# Patient Record
Sex: FEMALE | Race: ASIAN | NOT HISPANIC OR LATINO | URBAN - METROPOLITAN AREA
[De-identification: names, ages, dates, MRNs, and addresses within clinical notes are randomized per-mention and may not be internally consistent; named-entity substitution may affect disease eponyms.]

---

## 2017-08-27 ENCOUNTER — EMERGENCY (EMERGENCY)
Facility: HOSPITAL | Age: 57
LOS: 1 days | Discharge: HOME | End: 2017-08-27
Attending: EMERGENCY MEDICINE | Admitting: EMERGENCY MEDICINE
Payer: COMMERCIAL

## 2017-08-27 VITALS
WEIGHT: 115.08 LBS | DIASTOLIC BLOOD PRESSURE: 80 MMHG | HEART RATE: 82 BPM | RESPIRATION RATE: 18 BRPM | SYSTOLIC BLOOD PRESSURE: 196 MMHG | TEMPERATURE: 100 F | OXYGEN SATURATION: 99 %

## 2017-08-27 DIAGNOSIS — Z98.891 HISTORY OF UTERINE SCAR FROM PREVIOUS SURGERY: Chronic | ICD-10-CM

## 2017-08-27 PROCEDURE — 99285 EMERGENCY DEPT VISIT HI MDM: CPT | Mod: 25

## 2017-08-27 NOTE — ED ADULT NURSE NOTE - OBJECTIVE STATEMENT
57 y/o F, A&Ox3, enters ED w/ c/o flank pain. Pt. reports flank pain started suddenly earlier this evening. Pt. took Tylenol around 2230 on 8/27. Pt. denies fever/chills/diarrhea but reports nausea/vomiting. Pt. vomiting while in ED - given IV Zofran as per MD's orders. No blood in vomit. Pt. has positive CVA tenderness and tender to the RUQ and RLQs. Pt. still has appendix. Pt. also reports hematuria since Thursday or Friday. Skin warm, dry and intact. Family at bedside.

## 2017-08-27 NOTE — ED ADULT TRIAGE NOTE - CHIEF COMPLAINT QUOTE
right flank pain for past few hours; hematuria 2 or 3 days ago right flank pain for past few hours; hematuria 2 or 3 days ago; nausea

## 2017-08-28 VITALS
DIASTOLIC BLOOD PRESSURE: 63 MMHG | SYSTOLIC BLOOD PRESSURE: 105 MMHG | OXYGEN SATURATION: 97 % | TEMPERATURE: 99 F | HEART RATE: 75 BPM | RESPIRATION RATE: 18 BRPM

## 2017-08-28 LAB
ALBUMIN SERPL ELPH-MCNC: 4.7 G/DL — SIGNIFICANT CHANGE UP (ref 3.3–5)
ALP SERPL-CCNC: 56 U/L — SIGNIFICANT CHANGE UP (ref 40–120)
ALT FLD-CCNC: 20 U/L RC — SIGNIFICANT CHANGE UP (ref 10–45)
ANION GAP SERPL CALC-SCNC: 15 MMOL/L — SIGNIFICANT CHANGE UP (ref 5–17)
APPEARANCE UR: CLEAR — SIGNIFICANT CHANGE UP
AST SERPL-CCNC: 24 U/L — SIGNIFICANT CHANGE UP (ref 10–40)
BASOPHILS # BLD AUTO: 0.1 K/UL — SIGNIFICANT CHANGE UP (ref 0–0.2)
BASOPHILS NFR BLD AUTO: 1.2 % — SIGNIFICANT CHANGE UP (ref 0–2)
BILIRUB SERPL-MCNC: 0.4 MG/DL — SIGNIFICANT CHANGE UP (ref 0.2–1.2)
BILIRUB UR-MCNC: NEGATIVE — SIGNIFICANT CHANGE UP
BUN SERPL-MCNC: 18 MG/DL — SIGNIFICANT CHANGE UP (ref 7–23)
CALCIUM SERPL-MCNC: 10.1 MG/DL — SIGNIFICANT CHANGE UP (ref 8.4–10.5)
CHLORIDE SERPL-SCNC: 99 MMOL/L — SIGNIFICANT CHANGE UP (ref 96–108)
CO2 SERPL-SCNC: 30 MMOL/L — SIGNIFICANT CHANGE UP (ref 22–31)
COLOR SPEC: COLORLESS — SIGNIFICANT CHANGE UP
COMMENT - URINE: SIGNIFICANT CHANGE UP
CREAT SERPL-MCNC: 1.14 MG/DL — SIGNIFICANT CHANGE UP (ref 0.5–1.3)
DIFF PNL FLD: ABNORMAL
EOSINOPHIL # BLD AUTO: 0.4 K/UL — SIGNIFICANT CHANGE UP (ref 0–0.5)
EOSINOPHIL NFR BLD AUTO: 4.7 % — SIGNIFICANT CHANGE UP (ref 0–6)
EPI CELLS # UR: SIGNIFICANT CHANGE UP /HPF
GLUCOSE SERPL-MCNC: 139 MG/DL — HIGH (ref 70–99)
GLUCOSE UR QL: 50
HCT VFR BLD CALC: 38.9 % — SIGNIFICANT CHANGE UP (ref 34.5–45)
HGB BLD-MCNC: 13.4 G/DL — SIGNIFICANT CHANGE UP (ref 11.5–15.5)
KETONES UR-MCNC: NEGATIVE — SIGNIFICANT CHANGE UP
LEUKOCYTE ESTERASE UR-ACNC: ABNORMAL
LYMPHOCYTES # BLD AUTO: 2.8 K/UL — SIGNIFICANT CHANGE UP (ref 1–3.3)
LYMPHOCYTES # BLD AUTO: 35.4 % — SIGNIFICANT CHANGE UP (ref 13–44)
MCHC RBC-ENTMCNC: 32.6 PG — SIGNIFICANT CHANGE UP (ref 27–34)
MCHC RBC-ENTMCNC: 34.4 GM/DL — SIGNIFICANT CHANGE UP (ref 32–36)
MCV RBC AUTO: 94.9 FL — SIGNIFICANT CHANGE UP (ref 80–100)
MONOCYTES # BLD AUTO: 0.6 K/UL — SIGNIFICANT CHANGE UP (ref 0–0.9)
MONOCYTES NFR BLD AUTO: 7.3 % — SIGNIFICANT CHANGE UP (ref 2–14)
NEUTROPHILS # BLD AUTO: 4 K/UL — SIGNIFICANT CHANGE UP (ref 1.8–7.4)
NEUTROPHILS NFR BLD AUTO: 51.4 % — SIGNIFICANT CHANGE UP (ref 43–77)
NITRITE UR-MCNC: NEGATIVE — SIGNIFICANT CHANGE UP
PH UR: 7.5 — SIGNIFICANT CHANGE UP (ref 5–8)
PLATELET # BLD AUTO: 329 K/UL — SIGNIFICANT CHANGE UP (ref 150–400)
POTASSIUM SERPL-MCNC: 3.9 MMOL/L — SIGNIFICANT CHANGE UP (ref 3.5–5.3)
POTASSIUM SERPL-SCNC: 3.9 MMOL/L — SIGNIFICANT CHANGE UP (ref 3.5–5.3)
PROT SERPL-MCNC: 8.4 G/DL — HIGH (ref 6–8.3)
PROT UR-MCNC: SIGNIFICANT CHANGE UP
RBC # BLD: 4.09 M/UL — SIGNIFICANT CHANGE UP (ref 3.8–5.2)
RBC # FLD: 11.5 % — SIGNIFICANT CHANGE UP (ref 10.3–14.5)
RBC CASTS # UR COMP ASSIST: >50 /HPF (ref 0–2)
SODIUM SERPL-SCNC: 144 MMOL/L — SIGNIFICANT CHANGE UP (ref 135–145)
SP GR SPEC: 1.01 — LOW (ref 1.01–1.02)
UROBILINOGEN FLD QL: NEGATIVE — SIGNIFICANT CHANGE UP
WBC # BLD: 7.8 K/UL — SIGNIFICANT CHANGE UP (ref 3.8–10.5)
WBC # FLD AUTO: 7.8 K/UL — SIGNIFICANT CHANGE UP (ref 3.8–10.5)
WBC UR QL: SIGNIFICANT CHANGE UP /HPF (ref 0–5)

## 2017-08-28 PROCEDURE — 99284 EMERGENCY DEPT VISIT MOD MDM: CPT | Mod: 25

## 2017-08-28 PROCEDURE — 85027 COMPLETE CBC AUTOMATED: CPT

## 2017-08-28 PROCEDURE — 74176 CT ABD & PELVIS W/O CONTRAST: CPT

## 2017-08-28 PROCEDURE — 96374 THER/PROPH/DIAG INJ IV PUSH: CPT

## 2017-08-28 PROCEDURE — 81001 URINALYSIS AUTO W/SCOPE: CPT

## 2017-08-28 PROCEDURE — 96375 TX/PRO/DX INJ NEW DRUG ADDON: CPT

## 2017-08-28 PROCEDURE — 80053 COMPREHEN METABOLIC PANEL: CPT

## 2017-08-28 PROCEDURE — 74176 CT ABD & PELVIS W/O CONTRAST: CPT | Mod: 26

## 2017-08-28 RX ORDER — SODIUM CHLORIDE 9 MG/ML
3 INJECTION INTRAMUSCULAR; INTRAVENOUS; SUBCUTANEOUS ONCE
Refills: 0 | Status: COMPLETED | OUTPATIENT
Start: 2017-08-28 | End: 2017-08-28

## 2017-08-28 RX ORDER — ONDANSETRON 8 MG/1
4 TABLET, FILM COATED ORAL ONCE
Refills: 0 | Status: COMPLETED | OUTPATIENT
Start: 2017-08-28 | End: 2017-08-28

## 2017-08-28 RX ORDER — KETOROLAC TROMETHAMINE 30 MG/ML
30 SYRINGE (ML) INJECTION ONCE
Refills: 0 | Status: DISCONTINUED | OUTPATIENT
Start: 2017-08-28 | End: 2017-08-28

## 2017-08-28 RX ORDER — IBUPROFEN 200 MG
1 TABLET ORAL
Qty: 42 | Refills: 0
Start: 2017-08-28 | End: 2017-09-04

## 2017-08-28 RX ADMIN — Medication 30 MILLIGRAM(S): at 02:20

## 2017-08-28 RX ADMIN — ONDANSETRON 4 MILLIGRAM(S): 8 TABLET, FILM COATED ORAL at 00:13

## 2017-08-28 RX ADMIN — Medication 30 MILLIGRAM(S): at 00:13

## 2017-08-28 RX ADMIN — SODIUM CHLORIDE 3 MILLILITER(S): 9 INJECTION INTRAMUSCULAR; INTRAVENOUS; SUBCUTANEOUS at 00:11

## 2017-08-28 NOTE — ED PROVIDER NOTE - OBJECTIVE STATEMENT
Pt is a 56 year old F with pmh of diabetes, hypertension, hyperlipidemia, presenting with right sided flank pain of 7/10 intensity that radiates to the groin associated with nausea and vomiting. The pain has been going on for the last few hours and is unremitting. Additionally she has noticed blood in her urine and it burns when she urinates. She has never experienced these symptoms before. Pt denies fever, HA, dizziness, CP, SOB, diarrhea. Pt is a 56 year old F with pmh of diabetes, hypertension, hyperlipidemia, presenting with right sided flank pain of 7/10 intensity that radiates to the groin associated with nausea and vomiting. The pain has been going on for the last few hours and is unremitting. Additionally she has noticed blood in her urine and pain when she urinates. She has never experienced these symptoms before. Pt denies fever, HA, dizziness, CP, SOB, diarrhea.

## 2017-08-28 NOTE — ED PROVIDER NOTE - MEDICAL DECISION MAKING DETAILS
Pt is a 56 year old F pmh DM, HTN, hyperthyroid, w/ rt sided flank pain, hematuria, and dysuria. Pt is a 56 year old F pmh DM, HTN, hyperthyroid, w/ rt sided flank pain, hematuria, and dysuria. Will order CT with co Pt is a 56 year old F pmh DM, HTN, hyperthyroid, w/ rt sided flank pain, hematuria, and dysuria. Will order noncontrast CT, fluids, pain control, Pt is a 56 year old F pmh DM, HTN, hyperthyroid, w/ rt sided flank pain, hematuria, and dysuria. Will order noncontrast CT of abdomen/flank, UA, fluids, pain control, antiemetics.

## 2017-08-28 NOTE — ED PROVIDER NOTE - CARE PLAN
Principal Discharge DX:	Nephrolithiasis  Instructions for follow-up, activity and diet:	Please followup with Dr. Mathew Kirby at [phone]. Take ibuprofen every six hours as needed with food. Drink at least 2 Liters or 64 Ounces of water each day. Return for any persistent, worsening symptoms, or ANY concerns at all. Principal Discharge DX:	Nephrolithiasis  Instructions for follow-up, activity and diet:	Please follow-up with a urologist as soon as possible. Take the ibuprofen every six hours as needed with food and drink at least 2 Liters or 64 Ounces of water each day. Return for any persistent, worsening symptoms, or ANY concerns at all.

## 2017-08-28 NOTE — ED PROVIDER NOTE - PLAN OF CARE
Please followup with Dr. Mathew Kirby at [phone]. Take ibuprofen every six hours as needed with food. Drink at least 2 Liters or 64 Ounces of water each day. Return for any persistent, worsening symptoms, or ANY concerns at all. Please follow-up with a urologist as soon as possible. Take the ibuprofen every six hours as needed with food and drink at least 2 Liters or 64 Ounces of water each day. Return for any persistent, worsening symptoms, or ANY concerns at all.

## 2017-08-28 NOTE — ED PROVIDER NOTE - ATTENDING CONTRIBUTION TO CARE
Patient presenting with R flank pain associated with hematuria beginning two days ago.  Some associated urinary frequency/dysuria.  No fevers or chills, associated nausea but no radiation of pain into abdomen.  On exam patient vital signs within normal limits, uncomfortable but non toxic, RRR S1S2, lungs clear to ascultation bilaterally, abdomen soft, non tender, non distended, no CVA tenderness, afebrile in ED.  History most consistent with renal colic, lower probability for pyelonephritis given lack of fever/CVA tenderness, no evidence of surgical abdominal process on exam, plan for labs, pain control, CT A/P, re-evaluate.

## 2017-08-28 NOTE — ED PROVIDER NOTE - NEURO NEGATIVE STATEMENT, MLM
no loss of consciousness, no gait abnormality, no headache, no sensory deficits, and no weakness. no headache

## 2017-10-25 ENCOUNTER — INPATIENT (INPATIENT)
Facility: HOSPITAL | Age: 57
LOS: 1 days | Discharge: HOME | DRG: 690 | End: 2017-10-27
Attending: HOSPITALIST | Admitting: HOSPITALIST
Payer: COMMERCIAL

## 2017-10-25 VITALS
DIASTOLIC BLOOD PRESSURE: 77 MMHG | OXYGEN SATURATION: 98 % | TEMPERATURE: 99 F | SYSTOLIC BLOOD PRESSURE: 171 MMHG | RESPIRATION RATE: 20 BRPM | HEART RATE: 81 BPM

## 2017-10-25 DIAGNOSIS — Z98.891 HISTORY OF UTERINE SCAR FROM PREVIOUS SURGERY: Chronic | ICD-10-CM

## 2017-10-25 LAB
ALBUMIN SERPL ELPH-MCNC: 3.9 G/DL — SIGNIFICANT CHANGE UP (ref 3.3–5)
ALP SERPL-CCNC: 86 U/L — SIGNIFICANT CHANGE UP (ref 40–120)
ALT FLD-CCNC: 15 U/L RC — SIGNIFICANT CHANGE UP (ref 10–45)
ANION GAP SERPL CALC-SCNC: 17 MMOL/L — SIGNIFICANT CHANGE UP (ref 5–17)
APPEARANCE UR: CLEAR — SIGNIFICANT CHANGE UP
APTT BLD: 31.3 SEC — SIGNIFICANT CHANGE UP (ref 27.5–37.4)
AST SERPL-CCNC: 22 U/L — SIGNIFICANT CHANGE UP (ref 10–40)
BASOPHILS # BLD AUTO: 0 K/UL — SIGNIFICANT CHANGE UP (ref 0–0.2)
BASOPHILS NFR BLD AUTO: 0.2 % — SIGNIFICANT CHANGE UP (ref 0–2)
BILIRUB SERPL-MCNC: 0.5 MG/DL — SIGNIFICANT CHANGE UP (ref 0.2–1.2)
BILIRUB UR-MCNC: NEGATIVE — SIGNIFICANT CHANGE UP
BUN SERPL-MCNC: 28 MG/DL — HIGH (ref 7–23)
CALCIUM SERPL-MCNC: 10.2 MG/DL — SIGNIFICANT CHANGE UP (ref 8.4–10.5)
CHLORIDE SERPL-SCNC: 83 MMOL/L — LOW (ref 96–108)
CO2 SERPL-SCNC: 29 MMOL/L — SIGNIFICANT CHANGE UP (ref 22–31)
COLOR SPEC: YELLOW — SIGNIFICANT CHANGE UP
CREAT SERPL-MCNC: 1.99 MG/DL — HIGH (ref 0.5–1.3)
DIFF PNL FLD: ABNORMAL
EOSINOPHIL # BLD AUTO: 0.2 K/UL — SIGNIFICANT CHANGE UP (ref 0–0.5)
EOSINOPHIL NFR BLD AUTO: 1.6 % — SIGNIFICANT CHANGE UP (ref 0–6)
GAS PNL BLDV: SIGNIFICANT CHANGE UP
GLUCOSE SERPL-MCNC: 109 MG/DL — HIGH (ref 70–99)
GLUCOSE UR QL: NEGATIVE — SIGNIFICANT CHANGE UP
HCT VFR BLD CALC: 30.2 % — LOW (ref 34.5–45)
HGB BLD-MCNC: 10.7 G/DL — LOW (ref 11.5–15.5)
INR BLD: 1.04 RATIO — SIGNIFICANT CHANGE UP (ref 0.88–1.16)
KETONES UR-MCNC: NEGATIVE — SIGNIFICANT CHANGE UP
LEUKOCYTE ESTERASE UR-ACNC: ABNORMAL
LYMPHOCYTES # BLD AUTO: 1.9 K/UL — SIGNIFICANT CHANGE UP (ref 1–3.3)
LYMPHOCYTES # BLD AUTO: 16 % — SIGNIFICANT CHANGE UP (ref 13–44)
MCHC RBC-ENTMCNC: 33 PG — SIGNIFICANT CHANGE UP (ref 27–34)
MCHC RBC-ENTMCNC: 35.5 GM/DL — SIGNIFICANT CHANGE UP (ref 32–36)
MCV RBC AUTO: 93 FL — SIGNIFICANT CHANGE UP (ref 80–100)
MONOCYTES # BLD AUTO: 1 K/UL — HIGH (ref 0–0.9)
MONOCYTES NFR BLD AUTO: 8.2 % — SIGNIFICANT CHANGE UP (ref 2–14)
NEUTROPHILS # BLD AUTO: 8.9 K/UL — HIGH (ref 1.8–7.4)
NEUTROPHILS NFR BLD AUTO: 74.1 % — SIGNIFICANT CHANGE UP (ref 43–77)
NITRITE UR-MCNC: NEGATIVE — SIGNIFICANT CHANGE UP
PH UR: 5.5 — SIGNIFICANT CHANGE UP (ref 5–8)
PLATELET # BLD AUTO: 487 K/UL — HIGH (ref 150–400)
POTASSIUM SERPL-MCNC: 3.3 MMOL/L — LOW (ref 3.5–5.3)
POTASSIUM SERPL-SCNC: 3.3 MMOL/L — LOW (ref 3.5–5.3)
PROT SERPL-MCNC: 8.1 G/DL — SIGNIFICANT CHANGE UP (ref 6–8.3)
PROT UR-MCNC: 30 MG/DL
PROTHROM AB SERPL-ACNC: 11.2 SEC — SIGNIFICANT CHANGE UP (ref 9.8–12.7)
RBC # BLD: 3.25 M/UL — LOW (ref 3.8–5.2)
RBC # FLD: 11.7 % — SIGNIFICANT CHANGE UP (ref 10.3–14.5)
RBC CASTS # UR COMP ASSIST: SIGNIFICANT CHANGE UP /HPF (ref 0–2)
SODIUM SERPL-SCNC: 129 MMOL/L — LOW (ref 135–145)
SP GR SPEC: 1.01 — LOW (ref 1.01–1.02)
UROBILINOGEN FLD QL: NEGATIVE — SIGNIFICANT CHANGE UP
WBC # BLD: 12 K/UL — HIGH (ref 3.8–10.5)
WBC # FLD AUTO: 12 K/UL — HIGH (ref 3.8–10.5)
WBC UR QL: SIGNIFICANT CHANGE UP /HPF (ref 0–5)

## 2017-10-25 PROCEDURE — 99285 EMERGENCY DEPT VISIT HI MDM: CPT

## 2017-10-25 PROCEDURE — 74176 CT ABD & PELVIS W/O CONTRAST: CPT | Mod: 26

## 2017-10-25 RX ORDER — POTASSIUM CHLORIDE 20 MEQ
10 PACKET (EA) ORAL ONCE
Refills: 0 | Status: COMPLETED | OUTPATIENT
Start: 2017-10-25 | End: 2017-10-25

## 2017-10-25 RX ORDER — CEFTRIAXONE 500 MG/1
1 INJECTION, POWDER, FOR SOLUTION INTRAMUSCULAR; INTRAVENOUS ONCE
Refills: 0 | Status: COMPLETED | OUTPATIENT
Start: 2017-10-25 | End: 2017-10-25

## 2017-10-25 RX ORDER — POTASSIUM CHLORIDE 20 MEQ
40 PACKET (EA) ORAL ONCE
Refills: 0 | Status: COMPLETED | OUTPATIENT
Start: 2017-10-25 | End: 2017-10-25

## 2017-10-25 RX ORDER — SODIUM CHLORIDE 9 MG/ML
1000 INJECTION INTRAMUSCULAR; INTRAVENOUS; SUBCUTANEOUS ONCE
Refills: 0 | Status: COMPLETED | OUTPATIENT
Start: 2017-10-25 | End: 2017-10-25

## 2017-10-25 RX ADMIN — Medication 40 MILLIEQUIVALENT(S): at 22:24

## 2017-10-25 RX ADMIN — Medication 100 MILLIEQUIVALENT(S): at 22:29

## 2017-10-25 RX ADMIN — SODIUM CHLORIDE 1000 MILLILITER(S): 9 INJECTION INTRAMUSCULAR; INTRAVENOUS; SUBCUTANEOUS at 22:29

## 2017-10-25 RX ADMIN — CEFTRIAXONE 100 GRAM(S): 500 INJECTION, POWDER, FOR SOLUTION INTRAMUSCULAR; INTRAVENOUS at 23:59

## 2017-10-25 NOTE — ED PROVIDER NOTE - MEDICAL DECISION MAKING DETAILS
57 y/o F pmhx renal stone with recent diagnosis of UTI failed macrobid L flank pain radiating to LLQ today, subjective fever and chills, scheduled for urology procedure 11/2. PE remarkable for mild LLQ tenderness and L CVAT. will obtain urine, labs, provide ivf, ct a/p and reassess.

## 2017-10-25 NOTE — ED PROVIDER NOTE - OBJECTIVE STATEMENT
55 y/o F pmhx DM, HTN, HLD, presenting L sided flank pain since yesterday. States pain is located on L side and radiates into the L lower abdomen. She reports that she was diagnosed with a large kidney stone on the L in August, has been following with a urologist at [facility] since that time, and has a procedure scheduled for 11/3/17 to remove the stone. She reports that she was feeling well until 3 days ago she developed fevers and chills with dysuria, went to her PMD who prescribed her macrobid which she has been taking but has not experienced any improvement. She reports that this morning her pain became significantly worse 'went to her primary care doctor who gave her a shot for her pain and tried to take a urine sample because she was there for her pre-op testing.' She reports that she has been having difficulty with urination since this morning as well, passing only small amounts even though she is hydrating. Denies any fever or chills today. Denies nausea or vomiting.

## 2017-10-25 NOTE — ED ADULT NURSE NOTE - OBJECTIVE STATEMENT
Received patient awake and alert x 4, presents with left sided flank pain since yesterday and unable to urinate. States she had kidney stones in August and has been following up with a urologist. Denies any fever/chills, no nausea/vomiting. Breathing unlabored with no S/S of distress noted. family at bedside, safety maintained, will continue to monitor.

## 2017-10-25 NOTE — ED PROVIDER NOTE - ATTENDING CONTRIBUTION TO CARE
Pt with recent UTI and prior kidney stones presents with >1 week of fever, chills, and now one day of L flank pain, resolved after "shot of medication" by her PCP this morning, assoc with poor urine output.  No vomiting, cp, sob.  Exam: mild CVA td L.

## 2017-10-26 DIAGNOSIS — E11.9 TYPE 2 DIABETES MELLITUS WITHOUT COMPLICATIONS: ICD-10-CM

## 2017-10-26 DIAGNOSIS — N20.0 CALCULUS OF KIDNEY: ICD-10-CM

## 2017-10-26 DIAGNOSIS — N17.9 ACUTE KIDNEY FAILURE, UNSPECIFIED: ICD-10-CM

## 2017-10-26 DIAGNOSIS — N39.0 URINARY TRACT INFECTION, SITE NOT SPECIFIED: ICD-10-CM

## 2017-10-26 DIAGNOSIS — Z29.9 ENCOUNTER FOR PROPHYLACTIC MEASURES, UNSPECIFIED: ICD-10-CM

## 2017-10-26 DIAGNOSIS — I10 ESSENTIAL (PRIMARY) HYPERTENSION: ICD-10-CM

## 2017-10-26 LAB
ANION GAP SERPL CALC-SCNC: 16 MMOL/L — SIGNIFICANT CHANGE UP (ref 5–17)
BASOPHILS # BLD AUTO: 0.02 K/UL — SIGNIFICANT CHANGE UP (ref 0–0.2)
BASOPHILS NFR BLD AUTO: 0.2 % — SIGNIFICANT CHANGE UP (ref 0–2)
BUN SERPL-MCNC: 20 MG/DL — SIGNIFICANT CHANGE UP (ref 7–23)
CALCIUM SERPL-MCNC: 9.8 MG/DL — SIGNIFICANT CHANGE UP (ref 8.4–10.5)
CHLORIDE SERPL-SCNC: 93 MMOL/L — LOW (ref 96–108)
CO2 SERPL-SCNC: 27 MMOL/L — SIGNIFICANT CHANGE UP (ref 22–31)
CREAT SERPL-MCNC: 1.09 MG/DL — SIGNIFICANT CHANGE UP (ref 0.5–1.3)
EOSINOPHIL # BLD AUTO: 0.16 K/UL — SIGNIFICANT CHANGE UP (ref 0–0.5)
EOSINOPHIL NFR BLD AUTO: 1.5 % — SIGNIFICANT CHANGE UP (ref 0–6)
GLUCOSE SERPL-MCNC: 133 MG/DL — HIGH (ref 70–99)
HCT VFR BLD CALC: 30.6 % — LOW (ref 34.5–45)
HGB BLD-MCNC: 10.5 G/DL — LOW (ref 11.5–15.5)
IMM GRANULOCYTES NFR BLD AUTO: 0.4 % — SIGNIFICANT CHANGE UP (ref 0–1.5)
LYMPHOCYTES # BLD AUTO: 1.44 K/UL — SIGNIFICANT CHANGE UP (ref 1–3.3)
LYMPHOCYTES # BLD AUTO: 13.6 % — SIGNIFICANT CHANGE UP (ref 13–44)
MAGNESIUM SERPL-MCNC: 1.8 MG/DL — SIGNIFICANT CHANGE UP (ref 1.6–2.6)
MCHC RBC-ENTMCNC: 30.5 PG — SIGNIFICANT CHANGE UP (ref 27–34)
MCHC RBC-ENTMCNC: 34.3 GM/DL — SIGNIFICANT CHANGE UP (ref 32–36)
MCV RBC AUTO: 89 FL — SIGNIFICANT CHANGE UP (ref 80–100)
MONOCYTES # BLD AUTO: 0.95 K/UL — HIGH (ref 0–0.9)
MONOCYTES NFR BLD AUTO: 9 % — SIGNIFICANT CHANGE UP (ref 2–14)
NEUTROPHILS # BLD AUTO: 7.96 K/UL — HIGH (ref 1.8–7.4)
NEUTROPHILS NFR BLD AUTO: 75.3 % — SIGNIFICANT CHANGE UP (ref 43–77)
PHOSPHATE SERPL-MCNC: 3.3 MG/DL — SIGNIFICANT CHANGE UP (ref 2.5–4.5)
PLATELET # BLD AUTO: 520 K/UL — HIGH (ref 150–400)
POTASSIUM SERPL-MCNC: 4.1 MMOL/L — SIGNIFICANT CHANGE UP (ref 3.5–5.3)
POTASSIUM SERPL-SCNC: 4.1 MMOL/L — SIGNIFICANT CHANGE UP (ref 3.5–5.3)
RBC # BLD: 3.44 M/UL — LOW (ref 3.8–5.2)
RBC # FLD: 12.9 % — SIGNIFICANT CHANGE UP (ref 10.3–14.5)
SODIUM SERPL-SCNC: 136 MMOL/L — SIGNIFICANT CHANGE UP (ref 135–145)
WBC # BLD: 10.57 K/UL — HIGH (ref 3.8–10.5)
WBC # FLD AUTO: 10.57 K/UL — HIGH (ref 3.8–10.5)

## 2017-10-26 PROCEDURE — 99253 IP/OBS CNSLTJ NEW/EST LOW 45: CPT

## 2017-10-26 PROCEDURE — 99221 1ST HOSP IP/OBS SF/LOW 40: CPT

## 2017-10-26 PROCEDURE — 99223 1ST HOSP IP/OBS HIGH 75: CPT | Mod: GC

## 2017-10-26 PROCEDURE — 12345: CPT | Mod: GC,NC

## 2017-10-26 RX ORDER — TAMSULOSIN HYDROCHLORIDE 0.4 MG/1
0.8 CAPSULE ORAL AT BEDTIME
Refills: 0 | Status: DISCONTINUED | OUTPATIENT
Start: 2017-10-26 | End: 2017-10-27

## 2017-10-26 RX ORDER — HEPARIN SODIUM 5000 [USP'U]/ML
5000 INJECTION INTRAVENOUS; SUBCUTANEOUS EVERY 8 HOURS
Refills: 0 | Status: DISCONTINUED | OUTPATIENT
Start: 2017-10-26 | End: 2017-10-27

## 2017-10-26 RX ORDER — ASPIRIN/CALCIUM CARB/MAGNESIUM 324 MG
81 TABLET ORAL DAILY
Refills: 0 | Status: DISCONTINUED | OUTPATIENT
Start: 2017-10-26 | End: 2017-10-27

## 2017-10-26 RX ORDER — CEFTRIAXONE 500 MG/1
1 INJECTION, POWDER, FOR SOLUTION INTRAMUSCULAR; INTRAVENOUS EVERY 24 HOURS
Refills: 0 | Status: DISCONTINUED | OUTPATIENT
Start: 2017-10-26 | End: 2017-10-27

## 2017-10-26 RX ORDER — INFLUENZA VIRUS VACCINE 15; 15; 15; 15 UG/.5ML; UG/.5ML; UG/.5ML; UG/.5ML
0.5 SUSPENSION INTRAMUSCULAR ONCE
Refills: 0 | Status: DISCONTINUED | OUTPATIENT
Start: 2017-10-26 | End: 2017-10-27

## 2017-10-26 RX ORDER — SODIUM CHLORIDE 9 MG/ML
1000 INJECTION, SOLUTION INTRAVENOUS
Refills: 0 | Status: DISCONTINUED | OUTPATIENT
Start: 2017-10-26 | End: 2017-10-27

## 2017-10-26 RX ORDER — DEXTROSE 50 % IN WATER 50 %
12.5 SYRINGE (ML) INTRAVENOUS ONCE
Refills: 0 | Status: DISCONTINUED | OUTPATIENT
Start: 2017-10-26 | End: 2017-10-27

## 2017-10-26 RX ORDER — INSULIN LISPRO 100/ML
VIAL (ML) SUBCUTANEOUS
Refills: 0 | Status: DISCONTINUED | OUTPATIENT
Start: 2017-10-26 | End: 2017-10-27

## 2017-10-26 RX ORDER — DEXTROSE 50 % IN WATER 50 %
1 SYRINGE (ML) INTRAVENOUS ONCE
Refills: 0 | Status: DISCONTINUED | OUTPATIENT
Start: 2017-10-26 | End: 2017-10-27

## 2017-10-26 RX ORDER — DEXTROSE 50 % IN WATER 50 %
25 SYRINGE (ML) INTRAVENOUS ONCE
Refills: 0 | Status: DISCONTINUED | OUTPATIENT
Start: 2017-10-26 | End: 2017-10-27

## 2017-10-26 RX ORDER — INSULIN LISPRO 100/ML
VIAL (ML) SUBCUTANEOUS AT BEDTIME
Refills: 0 | Status: DISCONTINUED | OUTPATIENT
Start: 2017-10-26 | End: 2017-10-27

## 2017-10-26 RX ORDER — ATORVASTATIN CALCIUM 80 MG/1
80 TABLET, FILM COATED ORAL AT BEDTIME
Refills: 0 | Status: DISCONTINUED | OUTPATIENT
Start: 2017-10-26 | End: 2017-10-27

## 2017-10-26 RX ORDER — SODIUM CHLORIDE 9 MG/ML
1000 INJECTION INTRAMUSCULAR; INTRAVENOUS; SUBCUTANEOUS
Refills: 0 | Status: DISCONTINUED | OUTPATIENT
Start: 2017-10-26 | End: 2017-10-27

## 2017-10-26 RX ORDER — GLUCAGON INJECTION, SOLUTION 0.5 MG/.1ML
1 INJECTION, SOLUTION SUBCUTANEOUS ONCE
Refills: 0 | Status: DISCONTINUED | OUTPATIENT
Start: 2017-10-26 | End: 2017-10-27

## 2017-10-26 RX ADMIN — ATORVASTATIN CALCIUM 80 MILLIGRAM(S): 80 TABLET, FILM COATED ORAL at 22:09

## 2017-10-26 RX ADMIN — SODIUM CHLORIDE 100 MILLILITER(S): 9 INJECTION INTRAMUSCULAR; INTRAVENOUS; SUBCUTANEOUS at 05:26

## 2017-10-26 RX ADMIN — HEPARIN SODIUM 5000 UNIT(S): 5000 INJECTION INTRAVENOUS; SUBCUTANEOUS at 22:10

## 2017-10-26 RX ADMIN — Medication 81 MILLIGRAM(S): at 13:43

## 2017-10-26 RX ADMIN — TAMSULOSIN HYDROCHLORIDE 0.8 MILLIGRAM(S): 0.4 CAPSULE ORAL at 22:10

## 2017-10-26 NOTE — PROGRESS NOTE ADULT - PROBLEM SELECTOR PLAN 2
- resolved Cr 1.09 from 1.99 earlier  - likely secondary to obstructive uropathy vs. UTI vs. NSAID overuse  - holding home losartan and HCTZ; cont to monitor BP  - s/p 1L bolus 0.9NS; cont maintenance IVF @ 100 cc/hr  - monitor serum Cr on daily BMP

## 2017-10-26 NOTE — PROGRESS NOTE ADULT - ASSESSMENT
The patient is a 55 yo F with The patient is a 57 yo F with PMH of T2DM, HTN, HLD and recent dx of L staghorn calculus 8/17 p/w anuria and left flank pain found to be afebrile, mild leukocytosis, elevated Cr with CT abd showing persistent staghorn calculus, mild dilation of left upper pole 2/2 RAFY due to obstructive nephrolithiasis treated ceftriaxone and 1 L bolus NS. Pt is in stable, in no acute distress, no pain, resting comfortably now producing urine.

## 2017-10-26 NOTE — PROGRESS NOTE ADULT - PROBLEM SELECTOR PLAN 5
- BP currently within acceptable limits  - holding home losartan and HCTZ for RAFY  - cont to monitor BP

## 2017-10-26 NOTE — H&P ADULT - PROBLEM SELECTOR PLAN 2
- admission serum Cr 1.99  as compared to baseline Cr 0.8 in 08/2017  - likely secondary to obstructive uropathy vs. UTI vs. NSAID overuse  - holding home losartan and HCTZ; cont to monitor BP  - s/p 1L bolus 0.9NS; cont maintenance IVF @ 100 cc/hr  - monitor serum Cr on daily BMP

## 2017-10-26 NOTE — H&P ADULT - HISTORY OF PRESENT ILLNESS
56F h/o DM2, HTN and HLD  presents for L flank pain with known L. staghorn calculus (dx. 08/2017). Patient currently following with Urology at [place] and reports procedure for removal of staghorn scheduled for 11/3/17 however she has been experiencing intermittent left-sided abdominal/flank pain for 1 week associated with fever, chills and nausea today. She was treated for UTI outpatient with Macrobid 1 week ago. Prior to presenting to ED, patient seen by PMD who administered analgesia IV for comfort. 56F with DM2, HTN and HLD  presents for L flank pain with known L. staghorn calculus (dx. 08/2017). Patient has been experiencing intermittent left-sided abdominal/flank pain for 1 week with decreasing urination. Flank pain is associated with fever, chills and nausea. She reports Tmax > 100.0 F last week and mild subjective fevers this week. She was treated for UTI outpatient with Macrobid 1 week ago and dysuria has now resolved however patient is now concerning about decreased urination. Last week, she noted blood in her urine. Prior to presenting to ED today, patient went to her PMD for L. flank pain who administered pain medication intramuscularly in her left arm. She cannot recall the name however her pain has since been controlled, currently 2/10 on pain scale. Patient initially diagnosed with L. staghorn calculi at ED visit in 08/2017 and has since followed up Urology at [place]. She reports she is currently scheduled for removal of L. staghorn calculi on 11/3/17 at [place].

## 2017-10-26 NOTE — H&P ADULT - NSHPLABSRESULTS_GEN_ALL_CORE
Laboratory studies, imaging and EKG personally reviewed:                        10.7   12.0  )-----------( 487      ( 25 Oct 2017 21:30 )             30.2     10    129<L>  |  83<L>  |  28<H>  ----------------------------<  109<H>  3.3<L>   |  29  |  1.99<H>    Ca    10.2      25 Oct 2017 21:30  Mg     1.7     10    TPro  8.1  /  Alb  3.9  /  TBili  0.5  /  DBili  x   /  AST  22  /  ALT  15  /  AlkPhos  86  10        PT/INR - ( 25 Oct 2017 21:30 )   PT: 11.2 sec;   INR: 1.04 ratio    PTT - ( 25 Oct 2017 21:30 )  PTT:31.3 sec  Urinalysis Basic - ( 25 Oct 2017 22:41 )    Color: Yellow / Appearance: Clear / S.009 / pH: x  Gluc: x / Ketone: Negative  / Bili: Negative / Urobili: Negative   Blood: x / Protein: 30 mg/dL / Nitrite: Negative   Leuk Esterase: Moderate / RBC: 3-5 /HPF / WBC 26-50 /HPF   Sq Epi: x / Non Sq Epi: x / Bacteria: x      EXAM:  CT ABDOMEN AND PELVIS                          PROCEDURE DATE:  10/25/2017        INTERPRETATION:  CLINICAL INFORMATION: Left flank pain. History of   nephrolithiasis. Evaluate for obstructive uropathy.    COMPARISON: CT abdomen pelvis2017.    IMPRESSION:    Persistent staghorn calculus of the left kidney involving the left renal   pelvis and interpolar and lower polar major and minor calyces. Mild   dilatation of left upper pole calyces unchanged.    No right hydronephrosis or right obstructive uropathy.

## 2017-10-26 NOTE — H&P ADULT - NSHPPHYSICALEXAM_GEN_ALL_CORE
GENERAL: AOx3, NAD/ill-appearing, appears stated age   HEENT: pupils equal & reactive, no scleral icterus  NECK: supple, trachea midline, no thyromegaly  CARDIAC: S1 & S2 auscultated, RRR, no murmurs, no JVD   CHEST: breath sounds equal B/L, no increased WOB, no wheezes/crackles  ABDOMEN: ND, bowel sounds present, soft, NTTP, no organomegaly   NEURO: CN II-XII grossly intact, no focal deficits  MSKl: Strength appropriate in all extremities for age, ROM normal  SKIN: Warm and dry, no diffuse rashes, no wounds  PSYCH: Speech fluid, appropriate mood and affect  EXT: No LE edema, pedal pulses 2+ and equal B/L, capillary refill < 2 s GENERAL: AOx3, NAD, sleeping comfortably, appears stated age   HEENT: pupils equal & reactive, sclera clear  NECK: supple, no thyromegaly  CARDIAC: S1 & S2 present, normal rate & rhythm, no murmurs, no JVD, no lower leg swelling   CHEST: breath sounds equal B/L, no increased WOB, no wheezes/crackles  ABDOMEN: ND, soft, bowel sounds normal +RLE tenderness +mild R. CVA tenderness  NEURO: CN II-XII grossly intact, no focal deficits  MSKl: Strength appropriate in all extremities for age, ROM normal  SKIN: Warm and dry  PSYCH: Speech fluid, appropriate mood and affect  EXT: No LE edema, 2+ pedal pulses B/L

## 2017-10-26 NOTE — H&P ADULT - NSHPSOCIALHISTORY_GEN_ALL_CORE
Patient not currently employed as of this month. She is a life-long non-smoker, no alcohol or drug use. Patient not currently employed as of this month. She is a life-long non-smoker, no alcohol or drug use. Has sons.

## 2017-10-26 NOTE — H&P ADULT - PROBLEM SELECTOR PLAN 1
- no L. annelise-nephric fat stranding on CT A/P, lower concern for pyelonephritis given L. flank pain improved, afebrile, mild leukocytosis  - follow up urine cx.  - cont ceftriaxone pending urine cx.   - - no L. annelise-nephric fat stranding on CT A/P, lower concern for pyelonephritis given L. flank pain improved, afebrile, mild leukocytosis  - follow up urine cx.  - cont ceftriaxone IV q24  pending urine cx.

## 2017-10-26 NOTE — H&P ADULT - PROBLEM SELECTOR PLAN 4
- holding home Janumet, glipizide, acarbose, pioglitazone  - start low-intensity ISS  - monitor FSG qAC & qHS  - CC diet

## 2017-10-26 NOTE — PROGRESS NOTE ADULT - PROBLEM SELECTOR PLAN 3
- no interval size increase and mild dilatation of L. upper pole calyces unchanged  - patient seen by Urology in ED, no acute urologic intervention planned at this time   - considered nephrostomy tube but given RAFY resolved pt producing urine, imaging shows hydronephrosis unchanged, unecessary at this time.   - consider Flomax BID for improved urination  - cont maintenance IVF @ 100 cc/hr - no interval size increase and mild dilatation of L. upper pole calyces unchanged  - patient seen by Urology in ED, no acute urologic intervention planned at this time   - considered nephrostomy tube but given RAFY resolved pt producing urine, imaging shows hydronephrosis unchanged, unnecessary at this time.   - consider Flomax BID for improved urination  - cont maintenance IVF @ 100 cc/hr

## 2017-10-26 NOTE — H&P ADULT - PROBLEM SELECTOR PLAN 5
- BP currently within acceptable limits  - holding home losartan and HCTZ for RFAY  - cont to monitor BP

## 2017-10-26 NOTE — H&P ADULT - PROBLEM SELECTOR PLAN 3
- no interval size increase and mild dilatation of L. upper pole calyces unchanged  - patient seen by Urology in ED, no acute urologic intervention planned at this time however advising IR-guided nephrostomy tube placement   - discussed possibility of NT placement with patient to relieve symptoms and alleviate further kidney injury, patient agreeable; IR consult in AM  - cont maintenance IVF @ 100 cc/hr  - consider Flomax - no interval size increase and mild dilatation of L. upper pole calyces unchanged  - patient seen by Urology in ED, no acute urologic intervention planned at this time however advising IR-guided nephrostomy tube placement   - discussed possibility of NT placement with patient to relieve symptoms and alleviate further kidney injury, patient agreeable; IR consult in AM  - consider Flomax BID for improved urination  - cont maintenance IVF @ 100 cc/hr

## 2017-10-26 NOTE — PROGRESS NOTE ADULT - ATTENDING COMMENTS
-Patient seen and discussed on 10/26/17. -Patient seen and discussed on 10/26/17.  -Observe another day on IV ceftriaxone. F/u cultures inpatient and outpatient to tailor abx. -C/w IVF's. -Monitor urine output. -Monitor BMP in view of RAFY. -Monitor CBC for WBC's. -If still passing urine and otherwise clinically improved on 10/27/17, will DC to home to follow up for pre-planned urological procedure next week outpatient.

## 2017-10-26 NOTE — H&P ADULT - ASSESSMENT
56F with DM2, HTN and HLD  presents for L flank pain and decreased urine output in setting of RAFY likely due to obstructive nephrolithiasis.

## 2017-10-26 NOTE — H&P ADULT - NSHPREVIEWOFSYSTEMS_GEN_ALL_CORE
CONST: no fevers, no chills  EYES: no pain  ENT: no sore throat   CV: no chest pain  RESP: no shortness of breath  ABD: no abdominal pain   : no dysuria  MSK: no back pain  NEURO: no headache or additional neurologic complaints  HEME: no easy bleeding  SKIN:  no rash CONST: no weight loss, no sweats +fever +chills   HEENT: no sore throat   CV: no chest pain, no palpitations  RESP: no shortness of breath, no cough  ABD: L. sided abdominal/flank pain, no emesis, +nausea   : no dysuria, no foul-smelling urine +decreased urination +hematuria   MSK: +L. sided back pain   NEURO: no headache or additional neurologic complaints  HEME: no easy bleeding  SKIN:  no rash

## 2017-10-26 NOTE — PROGRESS NOTE ADULT - SUBJECTIVE AND OBJECTIVE BOX
Patient is a 56y old  Female who presents with a chief complaint of Left abdominal/flank pain with fever (26 Oct 2017 04:04)      SUBJECTIVE / OVERNIGHT EVENTS:    The patient is a 57 yo F with T2DM, HTN, HLD p/w anuria and L flank pain after recent dx of L. staghorn calculus (dx ). She states over the last week has had fever, chills, and nausea. She was treated outpatient with Dr. Parsons with macrobid. She was diagnosed with L staghorn calculus () and is schedule for removal of calculi 11/3/17 at [facility].     In the ED: started on ceftriaxone, fluids, Urine cx and Bcx sent     HPI:  56F with DM2, HTN and HLD  presents for L flank pain with known L. staghorn calculus (dx. 2017). Patient has been experiencing intermittent left-sided abdominal/flank pain for 1 week with decreasing urination. Flank pain is associated with fever, chills and nausea. She reports Tmax > 100.0 F last week and mild subjective fevers this week. She was treated for UTI outpatient with Macrobid 1 week ago and dysuria has now resolved however patient is now concerning about decreased urination. Last week, she noted blood in her urine. Prior to presenting to ED today, patient went to her PMD for L. flank pain who administered pain medication intramuscularly in her left arm. She cannot recall the name however her pain has since been controlled, currently 2/10 on pain scale. Patient initially diagnosed with L. staghorn calculi at ED visit in 2017 and has since followed up Urology at [place]. She reports she is currently scheduled for removal of L. staghorn calculi on 11/3/17 at [place]. (26 Oct 2017 04:04)      MEDICATIONS  (STANDING):  aspirin enteric coated 81 milliGRAM(s) Oral daily  atorvastatin 80 milliGRAM(s) Oral at bedtime  cefTRIAXone   IVPB 1 Gram(s) IV Intermittent every 24 hours  dextrose 5%. 1000 milliLiter(s) (50 mL/Hr) IV Continuous <Continuous>  dextrose 50% Injectable 12.5 Gram(s) IV Push once  dextrose 50% Injectable 25 Gram(s) IV Push once  dextrose 50% Injectable 25 Gram(s) IV Push once  heparin  Injectable 5000 Unit(s) SubCutaneous every 8 hours  influenza   Vaccine 0.5 milliLiter(s) IntraMuscular once  insulin lispro (HumaLOG) corrective regimen sliding scale   SubCutaneous three times a day before meals  insulin lispro (HumaLOG) corrective regimen sliding scale   SubCutaneous at bedtime  sodium chloride 0.9%. 1000 milliLiter(s) (100 mL/Hr) IV Continuous <Continuous>  tamsulosin 0.8 milliGRAM(s) Oral at bedtime    MEDICATIONS  (PRN):  dextrose Gel 1 Dose(s) Oral once PRN Blood Glucose LESS THAN 70 milliGRAM(s)/deciliter  glucagon  Injectable 1 milliGRAM(s) IntraMuscular once PRN Glucose LESS THAN 70 milligrams/deciliter      Vital Signs Last 24 Hrs  T(C): 36.7 (26 Oct 2017 13:36), Max: 37 (25 Oct 2017 18:07)  T(F): 98.1 (26 Oct 2017 13:36), Max: 98.6 (25 Oct 2017 18:07)  HR: 83 (26 Oct 2017 13:36) (75 - 84)  BP: 121/68 (26 Oct 2017 13:36) (99/57 - 171/77)  BP(mean): --  RR: 18 (26 Oct 2017 13:36) (18 - 20)  SpO2: 98% (26 Oct 2017 13:36) (97% - 99%)  CAPILLARY BLOOD GLUCOSE      POCT Blood Glucose.: 125 mg/dL (26 Oct 2017 12:38)  POCT Blood Glucose.: 140 mg/dL (26 Oct 2017 09:17)    I&O's Summary      PHYSICAL EXAM:  GENERAL: NAD, well-developed  HEAD:  Atraumatic, Normocephalic  EYES: EOMI, PERRLA, conjunctiva and sclera clear  NECK: Supple, No JVD  CHEST/LUNG: Clear to auscultation bilaterally; No wheeze  HEART: Regular rate and rhythm; No murmurs, rubs, or gallops  ABDOMEN: Soft, Nontender, Nondistended; Bowel sounds present  EXTREMITIES:  2+ Peripheral Pulses, No clubbing, cyanosis, or edema  PSYCH: AAOx3  NEUROLOGY: non-focal  SKIN: No rashes or lesions    LABS:                        10.5   10.57 )-----------( 520      ( 26 Oct 2017 07:28 )             30.6     10-    136  |  93<L>  |  20  ----------------------------<  133<H>  4.1   |  27  |  1.09    Ca    9.8      26 Oct 2017 07:39  Phos  3.3     10-26  Mg     1.8     10-26    TPro  8.1  /  Alb  3.9  /  TBili  0.5  /  DBili  x   /  AST  22  /  ALT  15  /  AlkPhos  86  10-25    PT/INR - ( 25 Oct 2017 21:30 )   PT: 11.2 sec;   INR: 1.04 ratio         PTT - ( 25 Oct 2017 21:30 )  PTT:31.3 sec      Urinalysis Basic - ( 25 Oct 2017 22:41 )    Color: Yellow / Appearance: Clear / S.009 / pH: x  Gluc: x / Ketone: Negative  / Bili: Negative / Urobili: Negative   Blood: x / Protein: 30 mg/dL / Nitrite: Negative   Leuk Esterase: Moderate / RBC: 3-5 /HPF / WBC 26-50 /HPF   Sq Epi: x / Non Sq Epi: x / Bacteria: x        RADIOLOGY & ADDITIONAL TESTS:    Imaging Personally Reviewed:    Consultant(s) Notes Reviewed:      Care Discussed with Consultants/Other Providers: Patient is a 56y old  Female who presents with a chief complaint of Left abdominal/flank pain with fever (26 Oct 2017 04:04)      SUBJECTIVE / OVERNIGHT EVENTS:    The patient is a 57 yo F with T2DM, HTN, HLD p/w anuria and L flank pain after recent dx of L. staghorn calculus (dx ). She states over the last week has had fever, chills, and nausea. She was treated outpatient with Dr. Parsons with macrobid. She was diagnosed with L staghorn calculus () and is schedule for removal of calculi 11/3/17 at [facility].     In the ED: started on ceftriaxone, fluids, Urine cx and Bcx sent     HPI:  56F with DM2, HTN and HLD  presents for L flank pain with known L. staghorn calculus (dx. 2017). Patient has been experiencing intermittent left-sided abdominal/flank pain for 1 week with decreasing urination. Flank pain is associated with fever, chills and nausea. She reports Tmax > 100.0 F last week and mild subjective fevers this week. She was treated for UTI outpatient with Macrobid 1 week ago and dysuria has now resolved however patient is now concerning about decreased urination. Last week, she noted blood in her urine. Prior to presenting to ED today, patient went to her PMD for L. flank pain who administered pain medication intramuscularly in her left arm. She cannot recall the name however her pain has since been controlled, currently 2/10 on pain scale. Patient initially diagnosed with L. staghorn calculi at ED visit in 2017 and has since followed up Urology at [place]. She reports she is currently scheduled for removal of L. staghorn calculi on 11/3/17 at [place]. (26 Oct 2017 04:04)      MEDICATIONS  (STANDING):  aspirin enteric coated 81 milliGRAM(s) Oral daily  atorvastatin 80 milliGRAM(s) Oral at bedtime  cefTRIAXone   IVPB 1 Gram(s) IV Intermittent every 24 hours  dextrose 5%. 1000 milliLiter(s) (50 mL/Hr) IV Continuous <Continuous>  dextrose 50% Injectable 12.5 Gram(s) IV Push once  dextrose 50% Injectable 25 Gram(s) IV Push once  dextrose 50% Injectable 25 Gram(s) IV Push once  heparin  Injectable 5000 Unit(s) SubCutaneous every 8 hours  influenza   Vaccine 0.5 milliLiter(s) IntraMuscular once  insulin lispro (HumaLOG) corrective regimen sliding scale   SubCutaneous three times a day before meals  insulin lispro (HumaLOG) corrective regimen sliding scale   SubCutaneous at bedtime  sodium chloride 0.9%. 1000 milliLiter(s) (100 mL/Hr) IV Continuous <Continuous>  tamsulosin 0.8 milliGRAM(s) Oral at bedtime    MEDICATIONS  (PRN):  dextrose Gel 1 Dose(s) Oral once PRN Blood Glucose LESS THAN 70 milliGRAM(s)/deciliter  glucagon  Injectable 1 milliGRAM(s) IntraMuscular once PRN Glucose LESS THAN 70 milligrams/deciliter      Vital Signs Last 24 Hrs  T(C): 36.7 (26 Oct 2017 13:36), Max: 37 (25 Oct 2017 18:07)  T(F): 98.1 (26 Oct 2017 13:36), Max: 98.6 (25 Oct 2017 18:07)  HR: 83 (26 Oct 2017 13:36) (75 - 84)  BP: 121/68 (26 Oct 2017 13:36) (99/57 - 171/77)  BP(mean): --  RR: 18 (26 Oct 2017 13:36) (18 - 20)  SpO2: 98% (26 Oct 2017 13:36) (97% - 99%)  CAPILLARY BLOOD GLUCOSE      POCT Blood Glucose.: 125 mg/dL (26 Oct 2017 12:38)  POCT Blood Glucose.: 140 mg/dL (26 Oct 2017 09:17)    I&O's Summary      PHYSICAL EXAM:  GENERAL: NAD, well-developed  HEAD:  Atraumatic, Normocephalic  EYES: EOMI, PERRLA, conjunctiva and sclera clear  NECK: Supple, No JVD  CHEST/LUNG: Clear to auscultation bilaterally; No wheeze  HEART: Regular rate and rhythm; No murmurs, rubs, or gallops  ABDOMEN: Soft, Nontender, Nondistended; Bowel sounds present  EXTREMITIES:  2+ Peripheral Pulses, No clubbing, cyanosis, or edema  PSYCH: AAOx3  NEUROLOGY: non-focal  SKIN: No rashes or lesions    LABS:                        10.5   10.57 )-----------( 520      ( 26 Oct 2017 07:28 )             30.6     10-    136  |  93<L>  |  20  ----------------------------<  133<H>  4.1   |  27  |  1.09    Ca    9.8      26 Oct 2017 07:39  Phos  3.3     10-26  Mg     1.8     10-26    TPro  8.1  /  Alb  3.9  /  TBili  0.5  /  DBili  x   /  AST  22  /  ALT  15  /  AlkPhos  86  10-25    PT/INR - ( 25 Oct 2017 21:30 )   PT: 11.2 sec;   INR: 1.04 ratio         PTT - ( 25 Oct 2017 21:30 )  PTT:31.3 sec      Urinalysis Basic - ( 25 Oct 2017 22:41 )    Color: Yellow / Appearance: Clear / S.009 / pH: x  Gluc: x / Ketone: Negative  / Bili: Negative / Urobili: Negative   Blood: x / Protein: 30 mg/dL / Nitrite: Negative   Leuk Esterase: Moderate / RBC: 3-5 /HPF / WBC 26-50 /HPF   Sq Epi: x / Non Sq Epi: x / Bacteria: x        RADIOLOGY & ADDITIONAL TESTS:    Imaging Personally Reviewed:    Consultant(s) Notes Reviewed:  Urology.     Care Discussed with Consultants/Other Providers:

## 2017-10-26 NOTE — PROGRESS NOTE ADULT - PROBLEM SELECTOR PLAN 1
- no L. annelise-nephric fat stranding on CT A/P, lower concern for pyelonephritis given L. flank pain improved, afebrile, mild leukocytosis  - follow up urine cx.  - cont ceftriaxone IV q24  pending urine cx.  - will f/u with Dr. Chaparro regarding sensitivities to previous culture.

## 2017-10-27 VITALS
HEART RATE: 76 BPM | SYSTOLIC BLOOD PRESSURE: 129 MMHG | RESPIRATION RATE: 18 BRPM | OXYGEN SATURATION: 97 % | DIASTOLIC BLOOD PRESSURE: 72 MMHG | TEMPERATURE: 98 F

## 2017-10-27 LAB
ANION GAP SERPL CALC-SCNC: 16 MMOL/L — SIGNIFICANT CHANGE UP (ref 5–17)
BUN SERPL-MCNC: 15 MG/DL — SIGNIFICANT CHANGE UP (ref 7–23)
CALCIUM SERPL-MCNC: 8.8 MG/DL — SIGNIFICANT CHANGE UP (ref 8.4–10.5)
CHLORIDE SERPL-SCNC: 99 MMOL/L — SIGNIFICANT CHANGE UP (ref 96–108)
CO2 SERPL-SCNC: 26 MMOL/L — SIGNIFICANT CHANGE UP (ref 22–31)
CREAT SERPL-MCNC: 0.83 MG/DL — SIGNIFICANT CHANGE UP (ref 0.5–1.3)
CULTURE RESULTS: NO GROWTH — SIGNIFICANT CHANGE UP
GLUCOSE SERPL-MCNC: 197 MG/DL — HIGH (ref 70–99)
HBA1C BLD-MCNC: 6.8 % — HIGH (ref 4–5.6)
HCT VFR BLD CALC: 30.3 % — LOW (ref 34.5–45)
HGB BLD-MCNC: 10.4 G/DL — LOW (ref 11.5–15.5)
MCHC RBC-ENTMCNC: 32.2 PG — SIGNIFICANT CHANGE UP (ref 27–34)
MCHC RBC-ENTMCNC: 34.4 GM/DL — SIGNIFICANT CHANGE UP (ref 32–36)
MCV RBC AUTO: 93.6 FL — SIGNIFICANT CHANGE UP (ref 80–100)
PLATELET # BLD AUTO: 527 K/UL — HIGH (ref 150–400)
POTASSIUM SERPL-MCNC: 3.5 MMOL/L — SIGNIFICANT CHANGE UP (ref 3.5–5.3)
POTASSIUM SERPL-SCNC: 3.5 MMOL/L — SIGNIFICANT CHANGE UP (ref 3.5–5.3)
RBC # BLD: 3.24 M/UL — LOW (ref 3.8–5.2)
RBC # FLD: 12.1 % — SIGNIFICANT CHANGE UP (ref 10.3–14.5)
SODIUM SERPL-SCNC: 141 MMOL/L — SIGNIFICANT CHANGE UP (ref 135–145)
SPECIMEN SOURCE: SIGNIFICANT CHANGE UP
WBC # BLD: 7.1 K/UL — SIGNIFICANT CHANGE UP (ref 3.8–10.5)
WBC # FLD AUTO: 7.1 K/UL — SIGNIFICANT CHANGE UP (ref 3.8–10.5)

## 2017-10-27 PROCEDURE — 84100 ASSAY OF PHOSPHORUS: CPT

## 2017-10-27 PROCEDURE — 82330 ASSAY OF CALCIUM: CPT

## 2017-10-27 PROCEDURE — 85027 COMPLETE CBC AUTOMATED: CPT

## 2017-10-27 PROCEDURE — 83605 ASSAY OF LACTIC ACID: CPT

## 2017-10-27 PROCEDURE — 83036 HEMOGLOBIN GLYCOSYLATED A1C: CPT

## 2017-10-27 PROCEDURE — 80053 COMPREHEN METABOLIC PANEL: CPT

## 2017-10-27 PROCEDURE — 87040 BLOOD CULTURE FOR BACTERIA: CPT

## 2017-10-27 PROCEDURE — 82962 GLUCOSE BLOOD TEST: CPT

## 2017-10-27 PROCEDURE — 99285 EMERGENCY DEPT VISIT HI MDM: CPT | Mod: 25

## 2017-10-27 PROCEDURE — 96375 TX/PRO/DX INJ NEW DRUG ADDON: CPT

## 2017-10-27 PROCEDURE — 99239 HOSP IP/OBS DSCHRG MGMT >30: CPT

## 2017-10-27 PROCEDURE — 87086 URINE CULTURE/COLONY COUNT: CPT

## 2017-10-27 PROCEDURE — 83735 ASSAY OF MAGNESIUM: CPT

## 2017-10-27 PROCEDURE — 74176 CT ABD & PELVIS W/O CONTRAST: CPT

## 2017-10-27 PROCEDURE — 84132 ASSAY OF SERUM POTASSIUM: CPT

## 2017-10-27 PROCEDURE — 84295 ASSAY OF SERUM SODIUM: CPT

## 2017-10-27 PROCEDURE — 85610 PROTHROMBIN TIME: CPT

## 2017-10-27 PROCEDURE — 96374 THER/PROPH/DIAG INJ IV PUSH: CPT

## 2017-10-27 PROCEDURE — 80048 BASIC METABOLIC PNL TOTAL CA: CPT

## 2017-10-27 PROCEDURE — 81001 URINALYSIS AUTO W/SCOPE: CPT

## 2017-10-27 PROCEDURE — 85730 THROMBOPLASTIN TIME PARTIAL: CPT

## 2017-10-27 PROCEDURE — 82803 BLOOD GASES ANY COMBINATION: CPT

## 2017-10-27 PROCEDURE — 85014 HEMATOCRIT: CPT

## 2017-10-27 PROCEDURE — 82947 ASSAY GLUCOSE BLOOD QUANT: CPT

## 2017-10-27 PROCEDURE — 82435 ASSAY OF BLOOD CHLORIDE: CPT

## 2017-10-27 RX ORDER — POTASSIUM CHLORIDE 20 MEQ
20 PACKET (EA) ORAL
Refills: 0 | Status: COMPLETED | OUTPATIENT
Start: 2017-10-27 | End: 2017-10-27

## 2017-10-27 RX ORDER — LOSARTAN POTASSIUM 100 MG/1
1 TABLET, FILM COATED ORAL
Qty: 30 | Refills: 0
Start: 2017-10-27 | End: 2017-11-26

## 2017-10-27 RX ORDER — TAMSULOSIN HYDROCHLORIDE 0.4 MG/1
2 CAPSULE ORAL
Qty: 0 | Refills: 0 | DISCHARGE
Start: 2017-10-27

## 2017-10-27 RX ORDER — CEFDINIR 250 MG/5ML
1 POWDER, FOR SUSPENSION ORAL
Qty: 10 | Refills: 0
Start: 2017-10-27 | End: 2017-11-01

## 2017-10-27 RX ADMIN — Medication 81 MILLIGRAM(S): at 13:36

## 2017-10-27 RX ADMIN — HEPARIN SODIUM 5000 UNIT(S): 5000 INJECTION INTRAVENOUS; SUBCUTANEOUS at 06:41

## 2017-10-27 RX ADMIN — Medication 1: at 08:38

## 2017-10-27 RX ADMIN — Medication 20 MILLIEQUIVALENT(S): at 13:37

## 2017-10-27 RX ADMIN — Medication 20 MILLIEQUIVALENT(S): at 10:35

## 2017-10-27 RX ADMIN — CEFTRIAXONE 100 GRAM(S): 500 INJECTION, POWDER, FOR SOLUTION INTRAMUSCULAR; INTRAVENOUS at 00:03

## 2017-10-27 RX ADMIN — HEPARIN SODIUM 5000 UNIT(S): 5000 INJECTION INTRAVENOUS; SUBCUTANEOUS at 13:36

## 2017-10-27 RX ADMIN — SODIUM CHLORIDE 100 MILLILITER(S): 9 INJECTION INTRAMUSCULAR; INTRAVENOUS; SUBCUTANEOUS at 06:41

## 2017-10-27 RX ADMIN — Medication 2: at 13:35

## 2017-10-27 RX ADMIN — Medication 1: at 17:37

## 2017-10-27 NOTE — DISCHARGE NOTE ADULT - ADDITIONAL INSTRUCTIONS
-Follow up with your medical clinic appt on Monday. -Follow up for your urological procedure as planned.

## 2017-10-27 NOTE — PROGRESS NOTE ADULT - PROBLEM SELECTOR PLAN 2
- resolved. Cr 0.83  - likely secondary to obstructive uropathy vs. UTI vs. NSAID overuse  - will restart home losartan and HCTZ; cont to monitor BP, /73  - s/p 1L bolus 0.9NS; cont maintenance IVF @ 100 cc/hr - resolved. Cr 0.83  - likely secondary to obstructive uropathy vs. UTI vs. NSAID overuse  - will restart home losartan at reduced dose and HCTZ; cont to monitor BP, /73  - s/p 1L bolus 0.9NS; cont maintenance IVF @ 100 cc/hr

## 2017-10-27 NOTE — PROGRESS NOTE ADULT - PROBLEM SELECTOR PLAN 4
- holding home Janumet, glipizide, acarbose, pioglitazone  - start low-intensity ISS  - monitor FSG qAC & qHS  - CC diet - holding home Janumet, glipizide, acarbose, pioglitazone while inpatient.   - start low-intensity ISS  - monitor FSG qAC & qHS  - CC diet

## 2017-10-27 NOTE — DISCHARGE NOTE ADULT - MEDICATION SUMMARY - MEDICATIONS TO TAKE
I will START or STAY ON the medications listed below when I get home from the hospital:    Aspir 81  --  by mouth   -- Indication: For Cad    ibuprofen 600 mg oral tablet  -- 1 tab(s) by mouth every 4 hours MDD:5 tabs  -- Do not take this drug if you are pregnant.  It is very important that you take or use this exactly as directed.  Do not skip doses or discontinue unless directed by your doctor.  May cause drowsiness or dizziness.  Obtain medical advice before taking any non-prescription drugs as some may affect the action of this medication.  Take with food or milk.    -- Indication: For Pain    losartan  -- 50 milligram(s) by mouth once a day  -- Indication: For Hypertension    tamsulosin 0.4 mg oral capsule  -- 2 cap(s) by mouth once a day (at bedtime)  -- Indication: For Renal calculi    Janumet  -- 50 milligram(s) by mouth once a day  -- Indication: For diabetes    pioglitazone  -- 30 milligram(s) by mouth once a day  -- Indication: For diabetes    acarbose  -- 50 milligram(s) by mouth once a day  -- Indication: For diabetes    glipiZIDE  -- 10 milligram(s) by mouth once a day  -- Indication: For diabetes    atorvastatin  -- 80  by mouth once a day  -- Indication: For diabetes    hydroCHLOROthiazide  -- 12.5 milligram(s) by mouth once a day  -- Indication: For Hypertension I will START or STAY ON the medications listed below when I get home from the hospital:    Aspir 81  --  by mouth   -- Indication: For Cad    ibuprofen 600 mg oral tablet  -- 1 tab(s) by mouth every 4 hours MDD:5 tabs  -- Do not take this drug if you are pregnant.  It is very important that you take or use this exactly as directed.  Do not skip doses or discontinue unless directed by your doctor.  May cause drowsiness or dizziness.  Obtain medical advice before taking any non-prescription drugs as some may affect the action of this medication.  Take with food or milk.    -- Indication: For Pain    losartan 25 mg oral tablet  -- 1 tab(s) by mouth once a day  -- Indication: For Hypertension    tamsulosin 0.4 mg oral capsule  -- 2 cap(s) by mouth once a day (at bedtime)  -- Indication: For Renal calculi    Janumet  -- 50 milligram(s) by mouth once a day  -- Indication: For diabetes    pioglitazone  -- 30 milligram(s) by mouth once a day  -- Indication: For diabetes    acarbose  -- 50 milligram(s) by mouth once a day  -- Indication: For diabetes    glipiZIDE  -- 10 milligram(s) by mouth once a day  -- Indication: For diabetes    atorvastatin  -- 80  by mouth once a day  -- Indication: For diabetes    cefdinir 300 mg oral capsule  -- 1 cap(s) by mouth 2 times a day   -- Finish all this medication unless otherwise directed by prescriber.    -- Indication: For UTI    hydroCHLOROthiazide  -- 12.5 milligram(s) by mouth once a day  -- Indication: For Hypertension I will START or STAY ON the medications listed below when I get home from the hospital:    Aspir 81  -- 1 tab(s) by mouth once a day  -- Indication: For CaD    losartan 25 mg oral tablet  -- 1 tab(s) by mouth once a day  -- Indication: For Hypertension    tamsulosin 0.4 mg oral capsule  -- 2 cap(s) by mouth once a day (at bedtime)  -- Indication: For Renal calculi    glipiZIDE  -- 10 milligram(s) by mouth once a day  -- Indication: For diabetes    Janumet  -- 50 milligram(s) by mouth once a day  -- Indication: For diabetes    pioglitazone  -- 30 milligram(s) by mouth once a day  -- Indication: For diabetes    acarbose  -- 50 milligram(s) by mouth once a day  -- Indication: For diabetes    atorvastatin  -- 80  by mouth once a day  -- Indication: For diabetes    cefdinir 300 mg oral capsule  -- 1 cap(s) by mouth 2 times a day   -- Finish all this medication unless otherwise directed by prescriber.    -- Indication: For UTI    hydroCHLOROthiazide  -- 12.5 milligram(s) by mouth once a day  -- Indication: For Hypertension

## 2017-10-27 NOTE — DISCHARGE NOTE ADULT - MEDICATION SUMMARY - MEDICATIONS TO CHANGE
I will SWITCH the dose or number of times a day I take the medications listed below when I get home from the hospital:  None I will SWITCH the dose or number of times a day I take the medications listed below when I get home from the hospital:    losartan  -- 50 milligram(s) by mouth once a day

## 2017-10-27 NOTE — PROGRESS NOTE ADULT - ATTENDING COMMENTS
-Patient seen and discussed on 10/27/17. -Patient seen and discussed on 10/27/17.  -Patient's WBC's and creatinine improved. No fevers. Feels well and making good urine output now. Cultures remain negative, but patient responded to IV ceftriaxone. Will continue abx outpatient with 5 more days of cefdinir 300mg PO twice daily. She has her preop clearance appt with her PMD on Monday. She will have her staghorn calculus removal procedure next week. Will try to inform her urologist- Dr. Alejandro of her admission here. Patient requested copies of her labs/tests while inpatient, will provide.   -Will resume HCTZ and losartan on DC. Will reduce losartan to 25mg daily in view of borderline BP's and recent RAFY.   -DC to home in stable and improved condition with outpatient follow up.

## 2017-10-27 NOTE — DISCHARGE NOTE ADULT - CARE PLAN
Principal Discharge DX:	Staghorn calculus  Goal:	stable  Instructions for follow-up, activity and diet:	Please take the 5 day course of cefdinir 300 mg tablets twice a day by mouth. You will have kidney stone removed on 11/3 at [facility].  Secondary Diagnosis:	Acute kidney injury  Instructions for follow-up, activity and diet:	Your kidney functioned weakened on arrival at the hospital. This is most likely because the urine you were producing was obstructed by a kidney stone. When you were in the hospital you started urinating again and received fluids which rehydrated you. Please continue to hydrate yourself at home. Please have your BMP checked by Dr. Chaparro after your surgery.  Secondary Diagnosis:	Diabetes  Instructions for follow-up, activity and diet:	Please continue taking your diabetes medication at home including the glipizide 10 mg po daily, Janumet 50 mg daily once a day, pioglitazone 30 mg once a day,  Secondary Diagnosis:	Hypertension  Instructions for follow-up, activity and diet:	Please continue taking the losartan 50 mg by mouth once daily and hydrochlorothiazide 12.5 mg by mouth once daily Principal Discharge DX:	Staghorn calculus  Goal:	stable  Instructions for follow-up, activity and diet:	Please take the 5 day course of cefdinir 300 mg tablets twice a day by mouth. You will have kidney stone removed on 11/3 at [facility].  Secondary Diagnosis:	Acute kidney injury  Instructions for follow-up, activity and diet:	Your kidney functioned weakened on arrival at the hospital. This is most likely because the urine you were producing was obstructed by a kidney stone. When you were in the hospital you started urinating again and received fluids which rehydrated you. Please continue to hydrate yourself at home. Please have your BMP checked by Dr. Chaparro after your surgery.  Secondary Diagnosis:	Diabetes  Instructions for follow-up, activity and diet:	Please continue taking your diabetes medication at home including the glipizide 10 mg po daily, Janumet 50 mg daily once a day, pioglitazone 30 mg once a day,  Secondary Diagnosis:	Hypertension  Instructions for follow-up, activity and diet:	Please continue taking the losartan by mouth once daily and hydrochlorothiazide 12.5 mg by mouth once daily. We have reduced your dose of losartan to 25mg daily.

## 2017-10-27 NOTE — PROGRESS NOTE ADULT - SUBJECTIVE AND OBJECTIVE BOX
Patient is a 56y old  Female who presents with a chief complaint of Left abdominal/flank pain with fever (26 Oct 2017 04:04)      SUBJECTIVE / OVERNIGHT EVENTS:    Pt reports no concern overnight, making     MEDICATIONS  (STANDING):  aspirin enteric coated 81 milliGRAM(s) Oral daily  atorvastatin 80 milliGRAM(s) Oral at bedtime  cefTRIAXone   IVPB 1 Gram(s) IV Intermittent every 24 hours  dextrose 5%. 1000 milliLiter(s) (50 mL/Hr) IV Continuous <Continuous>  dextrose 50% Injectable 12.5 Gram(s) IV Push once  dextrose 50% Injectable 25 Gram(s) IV Push once  dextrose 50% Injectable 25 Gram(s) IV Push once  heparin  Injectable 5000 Unit(s) SubCutaneous every 8 hours  influenza   Vaccine 0.5 milliLiter(s) IntraMuscular once  insulin lispro (HumaLOG) corrective regimen sliding scale   SubCutaneous three times a day before meals  insulin lispro (HumaLOG) corrective regimen sliding scale   SubCutaneous at bedtime  sodium chloride 0.9%. 1000 milliLiter(s) (100 mL/Hr) IV Continuous <Continuous>  tamsulosin 0.8 milliGRAM(s) Oral at bedtime    MEDICATIONS  (PRN):  dextrose Gel 1 Dose(s) Oral once PRN Blood Glucose LESS THAN 70 milliGRAM(s)/deciliter  glucagon  Injectable 1 milliGRAM(s) IntraMuscular once PRN Glucose LESS THAN 70 milligrams/deciliter      Vital Signs Last 24 Hrs  T(C): 36.9 (27 Oct 2017 06:20), Max: 37.3 (26 Oct 2017 21:42)  T(F): 98.4 (27 Oct 2017 06:20), Max: 99.1 (26 Oct 2017 21:42)  HR: 61 (27 Oct 2017 06:20) (61 - 71)  BP: 153/77 (27 Oct 2017 06:20) (138/74 - 153/77)  BP(mean): --  RR: 18 (27 Oct 2017 06:20) (18 - 18)  SpO2: 95% (27 Oct 2017 06:20) (95% - 98%)  CAPILLARY BLOOD GLUCOSE      POCT Blood Glucose.: 210 mg/dL (27 Oct 2017 12:43)  POCT Blood Glucose.: 173 mg/dL (27 Oct 2017 08:15)  POCT Blood Glucose.: 211 mg/dL (26 Oct 2017 22:21)  POCT Blood Glucose.: 101 mg/dL (26 Oct 2017 17:43)    I&O's Summary    27 Oct 2017 07:01  -  27 Oct 2017 13:55  --------------------------------------------------------  IN: 350 mL / OUT: 0 mL / NET: 350 mL        PHYSICAL EXAM:  GENERAL: NAD, well-developed  HEAD:  Atraumatic, Normocephalic  EYES: EOMI, PERRLA, conjunctiva and sclera clear  NECK: Supple, No JVD  CHEST/LUNG: Clear to auscultation bilaterally; No wheeze  HEART: Regular rate and rhythm; No murmurs, rubs, or gallops  ABDOMEN: Soft, Nontender, Nondistended; Bowel sounds present  EXTREMITIES:  2+ Peripheral Pulses, No clubbing, cyanosis, or edema  PSYCH: AAOx3  NEUROLOGY: non-focal  SKIN: No rashes or lesions    LABS:                        10.4   7.1   )-----------( 527      ( 27 Oct 2017 06:41 )             30.3     10-    141  |  99  |  15  ----------------------------<  197<H>  3.5   |  26  |  0.83    Ca    8.8      27 Oct 2017 06:41  Phos  3.3     10-26  Mg     1.8     10-26    TPro  8.1  /  Alb  3.9  /  TBili  0.5  /  DBili  x   /  AST  22  /  ALT  15  /  AlkPhos  86  10-25    PT/INR - ( 25 Oct 2017 21:30 )   PT: 11.2 sec;   INR: 1.04 ratio         PTT - ( 25 Oct 2017 21:30 )  PTT:31.3 sec      Urinalysis Basic - ( 25 Oct 2017 22:41 )    Color: Yellow / Appearance: Clear / S.009 / pH: x  Gluc: x / Ketone: Negative  / Bili: Negative / Urobili: Negative   Blood: x / Protein: 30 mg/dL / Nitrite: Negative   Leuk Esterase: Moderate / RBC: 3-5 /HPF / WBC 26-50 /HPF   Sq Epi: x / Non Sq Epi: x / Bacteria: x        RADIOLOGY & ADDITIONAL TESTS:    Imaging Personally Reviewed:    Consultant(s) Notes Reviewed:      Care Discussed with Consultants/Other Providers:

## 2017-10-27 NOTE — DISCHARGE NOTE ADULT - HOSPITAL COURSE
The patient is a 56F with DM2, HTN and HLD  presents for L flank pain with known L. staghorn calculus (dx. 08/2017). Patient has been experiencing intermittent left-sided abdominal/flank pain for 1 week with decreasing urination. Flank pain is associated with fever, chills and nausea. She reports Tmax > 100.0 F last week and mild subjective fevers this week. She was treated for UTI outpatient with Macrobid 1 week ago and dysuria has now resolved however patient is now concerning about decreased urination. Last week, she noted blood in her urine. Prior to presenting to ED today, patient went to her PMD for L. flank pain who administered pain medication intramuscularly in her left arm. She cannot recall the name however her pain has since been controlled, currently 2/10 on pain scale. Patient initially diagnosed with L. staghorn calculi at ED visit in 08/2017 and has since followed up Urology at [place]. She reports she is currently scheduled for removal of L. staghorn calculi on 11/3/17 at [place].       In the ED: UA showed moderate leukocyte esterase. The patient was started on ceftriaxone, received fluids, and had urine and blood cultures sent. Urology was called to follow the patient and suggested possible nephrostomy tubes through IR. The patient was admitted to medicine    While admitted the patient continued to receive ceftriaxone and fluids. Her RAFY resolved with Cr donwtrending from 1.99 -> 0.83. The patient also started urinating after receiving fluids. After observing the patient for 24 hours she was hemodynamically stable, afebrile, downtrending white count, with no reported pain. She was cleared for discharge home on a 5 day course of cefidinir 300 mg bid to get her staghorn calculi removed on 11/3 at [facility]. The patient is a 56F with DM2, HTN and HLD  presents for L flank pain with known L. staghorn calculus (dx. 08/2017). Patient has been experiencing intermittent left-sided abdominal/flank pain for 1 week with decreasing urination. Flank pain is associated with fever, chills and nausea. She reports Tmax > 100.0 F last week and mild subjective fevers this week. She was treated for UTI outpatient with Macrobid 1 week ago and dysuria has now resolved however patient is now concerning about decreased urination. Last week, she noted blood in her urine. Prior to presenting to ED, patient went to her PMD for L. flank pain who administered pain medication intramuscularly in her left arm. She cannot recall the name however her pain has since been controlled, currently 2/10 on pain scale. Patient initially diagnosed with L. staghorn calculi at ED visit in 08/2017 and has since followed up Urology at [place]. She reports she is currently scheduled for removal of L. staghorn calculi on 11/3/17 at [place].       In the ED: UA showed moderate leukocyte esterase. The patient was started on ceftriaxone, received fluids, and had urine and blood cultures sent. Urology was called to follow the patient and suggested possible nephrostomy tubes through IR. The patient was admitted to medicine    While admitted the patient continued to receive ceftriaxone and fluids. Her RAFY resolved with Cr donwtrending from 1.99 -> 0.83. The patient also started urinating after receiving fluids. After observing the patient for 24 hours she was hemodynamically stable, afebrile, downtrending white count, with no reported pain. She was cleared for discharge home on a 5 day course of cefidinir 300 mg bid to get her staghorn calculi removed on 11/3 at [facility]. The patient is a 56F with DM2, HTN and HLD  presents for L flank pain with known L. staghorn calculus (dx. 08/2017). Patient has been experiencing intermittent left-sided abdominal/flank pain for 1 week with decreasing urination. Flank pain is associated with fever, chills and nausea. She reports Tmax > 100.0 F last week and mild subjective fevers this week. She was treated for UTI outpatient with Macrobid 1 week ago and dysuria has now resolved however patient is now concerning about decreased urination. Last week, she noted blood in her urine. Prior to presenting to ED, patient went to her PMD for L. flank pain who administered pain medication intramuscularly in her left arm. She cannot recall the name however her pain has since been controlled, currently 2/10 on pain scale. Patient initially diagnosed with L. staghorn calculi at ED visit in 08/2017 and has since followed up Urology at [place]. She reports she is currently scheduled for removal of L. staghorn calculi on 11/3/17 at [place].       In the ED: UA showed moderate leukocyte esterase. The patient was started on ceftriaxone, received fluids, and had urine and blood cultures sent. Urology was called to follow the patient and suggested possible nephrostomy tubes through IR. The patient was admitted to medicine.    While admitted the patient continued to receive ceftriaxone and fluids. Her RAFY resolved with Cr donwtrending from 1.99 -> 0.83. The patient also started urinating after receiving fluids. After observing the patient for 24 hours she was hemodynamically stable, afebrile, downtrending white count, with no reported pain. She was cleared for discharge home on a 5 day course of cefidinir 300 mg bid to get her staghorn calculi removed on 11/3 at [facility].

## 2017-10-27 NOTE — PROGRESS NOTE ADULT - PROBLEM SELECTOR PLAN 5
- BP currently within acceptable limits  - holding home losartan and HCTZ for RAFY  - will restart meds upon d/c - BP currently within acceptable limits  - holding home losartan and HCTZ for RAFY  - will restart meds upon d/c. Will resume losartan at reduced dose.

## 2017-10-27 NOTE — DISCHARGE NOTE ADULT - PROVIDER TOKENS
FREE:[LAST:[teodoro],FIRST:[amy],PHONE:[(   )    -],FAX:[(   )    -],ADDRESS:[[address] [place] [address], [facility]]]

## 2017-10-27 NOTE — PROGRESS NOTE ADULT - SUBJECTIVE AND OBJECTIVE BOX
Patient is a 56y old  Female who presents with a chief complaint of Left abdominal/flank pain with fever (26 Oct 2017 04:04)      SUBJECTIVE / OVERNIGHT EVENTS:    Pt reports no concern overnight, making good urine, reports no pain, can go home for discharge.     MEDICATIONS  (STANDING):  aspirin enteric coated 81 milliGRAM(s) Oral daily  atorvastatin 80 milliGRAM(s) Oral at bedtime  cefTRIAXone   IVPB 1 Gram(s) IV Intermittent every 24 hours  dextrose 5%. 1000 milliLiter(s) (50 mL/Hr) IV Continuous <Continuous>  dextrose 50% Injectable 12.5 Gram(s) IV Push once  dextrose 50% Injectable 25 Gram(s) IV Push once  dextrose 50% Injectable 25 Gram(s) IV Push once  heparin  Injectable 5000 Unit(s) SubCutaneous every 8 hours  influenza   Vaccine 0.5 milliLiter(s) IntraMuscular once  insulin lispro (HumaLOG) corrective regimen sliding scale   SubCutaneous three times a day before meals  insulin lispro (HumaLOG) corrective regimen sliding scale   SubCutaneous at bedtime  sodium chloride 0.9%. 1000 milliLiter(s) (100 mL/Hr) IV Continuous <Continuous>  tamsulosin 0.8 milliGRAM(s) Oral at bedtime    MEDICATIONS  (PRN):  dextrose Gel 1 Dose(s) Oral once PRN Blood Glucose LESS THAN 70 milliGRAM(s)/deciliter  glucagon  Injectable 1 milliGRAM(s) IntraMuscular once PRN Glucose LESS THAN 70 milligrams/deciliter      Vital Signs Last 24 Hrs  T(C): 36.9 (27 Oct 2017 06:20), Max: 37.3 (26 Oct 2017 21:42)  T(F): 98.4 (27 Oct 2017 06:20), Max: 99.1 (26 Oct 2017 21:42)  HR: 61 (27 Oct 2017 06:20) (61 - 71)  BP: 153/77 (27 Oct 2017 06:20) (138/74 - 153/77)  BP(mean): --  RR: 18 (27 Oct 2017 06:20) (18 - 18)  SpO2: 95% (27 Oct 2017 06:20) (95% - 98%)  CAPILLARY BLOOD GLUCOSE      POCT Blood Glucose.: 210 mg/dL (27 Oct 2017 12:43)  POCT Blood Glucose.: 173 mg/dL (27 Oct 2017 08:15)  POCT Blood Glucose.: 211 mg/dL (26 Oct 2017 22:21)  POCT Blood Glucose.: 101 mg/dL (26 Oct 2017 17:43)    I&O's Summary    27 Oct 2017 07:01  -  27 Oct 2017 13:55  --------------------------------------------------------  IN: 350 mL / OUT: 0 mL / NET: 350 mL        PHYSICAL EXAM:  GENERAL: NAD, well-developed  HEAD:  Atraumatic, Normocephalic  EYES: EOMI, PERRLA, conjunctiva and sclera clear  NECK: Supple, No JVD  CHEST/LUNG: Clear to auscultation bilaterally; No wheeze  HEART: Regular rate and rhythm; No murmurs, rubs, or gallops  ABDOMEN: Soft, Nontender, Nondistended; Bowel sounds present  EXTREMITIES:  2+ Peripheral Pulses, No clubbing, cyanosis, or edema  PSYCH: AAOx3  NEUROLOGY: non-focal  SKIN: No rashes or lesions    LABS:                        10.4   7.1   )-----------( 527      ( 27 Oct 2017 06:41 )             30.3     10-27    141  |  99  |  15  ----------------------------<  197<H>  3.5   |  26  |  0.83    Ca    8.8      27 Oct 2017 06:41  Phos  3.3     10-26  Mg     1.8     10-    TPro  8.1  /  Alb  3.9  /  TBili  0.5  /  DBili  x   /  AST  22  /  ALT  15  /  AlkPhos  86  10-25    PT/INR - ( 25 Oct 2017 21:30 )   PT: 11.2 sec;   INR: 1.04 ratio         PTT - ( 25 Oct 2017 21:30 )  PTT:31.3 sec      Urinalysis Basic - ( 25 Oct 2017 22:41 )    Color: Yellow / Appearance: Clear / S.009 / pH: x  Gluc: x / Ketone: Negative  / Bili: Negative / Urobili: Negative   Blood: x / Protein: 30 mg/dL / Nitrite: Negative   Leuk Esterase: Moderate / RBC: 3-5 /HPF / WBC 26-50 /HPF   Sq Epi: x / Non Sq Epi: x / Bacteria: x        RADIOLOGY & ADDITIONAL TESTS:    Imaging Personally Reviewed:    Consultant(s) Notes Reviewed:      Care Discussed with Consultants/Other Providers:

## 2017-10-27 NOTE — PROGRESS NOTE ADULT - PROBLEM SELECTOR PLAN 1
- no L. annelise-nephric fat stranding on CT A/P, lower concern for pyelonephritis given L. flank pain improved, afebrile, mild leukocytosis  - follow up urine cx.  - tried to contact Dr. Trista Chaparro several times to obtain sensitivities for UTI from last week but unable to reach  - will d/c home on cefdinir 300 mg po bid x 5 days - no L. annelise-nephric fat stranding on CT A/P, lower concern for pyelonephritis given L. flank pain improved, afebrile, mild leukocytosis resolved.   - Urine and blood cultures negative.   - tried to contact Dr. Trista Chaparro several times to obtain sensitivities for UTI from last week but unable to reach  - will d/c home on cefdinir 300 mg po bid x 5 days

## 2017-10-27 NOTE — DISCHARGE NOTE ADULT - MEDICATION SUMMARY - MEDICATIONS TO STOP TAKING
I will STOP taking the medications listed below when I get home from the hospital:  None I will STOP taking the medications listed below when I get home from the hospital:    ibuprofen 600 mg oral tablet  -- 1 tab(s) by mouth every 4 hours MDD:5 tabs  -- Do not take this drug if you are pregnant.  It is very important that you take or use this exactly as directed.  Do not skip doses or discontinue unless directed by your doctor.  May cause drowsiness or dizziness.  Obtain medical advice before taking any non-prescription drugs as some may affect the action of this medication.  Take with food or milk.

## 2017-10-27 NOTE — DISCHARGE NOTE ADULT - OTHER SIGNIFICANT FINDINGS
< from: CT Abdomen and Pelvis No Cont (10.25.17 @ 22:18) >  IMPRESSION:    Persistent staghorn calculus of the left kidney involving the left renal   pelvis and interpolar and lower polar major and minor calyces. Mild   dilatation of left upper pole calyces unchanged.    No right hydronephrosis or right obstructive uropathy.    < end of copied text >

## 2017-10-27 NOTE — DISCHARGE NOTE ADULT - PATIENT PORTAL LINK FT
“You can access the Follow[employer] Patient Portal, offered by [facility], by registering with the following website: [email or web address]”

## 2017-10-27 NOTE — DISCHARGE NOTE ADULT - PLAN OF CARE
Your kidney functioned weakened on arrival at the hospital. This is most likely because the urine you were producing was obstructed by a kidney stone. When you were in the hospital you started urinating again and received fluids which rehydrated you. Please continue to hydrate yourself at home. Please have your BMP checked by Dr. Chaparro after your surgery. stable Please continue taking your diabetes medication at home including the glipizide 10 mg po daily, Janumet 50 mg daily once a day, pioglitazone 30 mg once a day, Please take the 5 day course of cefdinir 300 mg tablets twice a day by mouth. You will have kidney stone removed on 11/3 at [facility]. Please continue taking the losartan 50 mg by mouth once daily and hydrochlorothiazide 12.5 mg by mouth once daily Please continue taking the losartan by mouth once daily and hydrochlorothiazide 12.5 mg by mouth once daily. We have reduced your dose of losartan to 25mg daily.

## 2017-10-27 NOTE — PROGRESS NOTE ADULT - ASSESSMENT
The patient is a 55 yo F with PMH of T2DM, HTN, HLD and recent dx of L staghorn calculus 8/17 p/w anuria and left flank pain found to be afebrile, mild leukocytosis, elevated Cr with CT abd showing persistent staghorn calculus, mild dilation of left upper pole 2/2 RAFY due to obstructive nephrolithiasis treated ceftriaxone and 1 L bolus NS. Pt is in stable, in no acute distress, no pain, resting comfortably now producing urine to be discharged home on cefdinir 300 mg bid for 5 day course

## 2017-10-31 LAB
CULTURE RESULTS: SIGNIFICANT CHANGE UP
CULTURE RESULTS: SIGNIFICANT CHANGE UP
SPECIMEN SOURCE: SIGNIFICANT CHANGE UP
SPECIMEN SOURCE: SIGNIFICANT CHANGE UP

## 2020-01-28 NOTE — ED PROVIDER NOTE - PROGRESS NOTE DETAILS
urology consulted for diagnosis of calculus pyelonephritis. will come to see patient at this time concern for need for intervention. -Adrienne Daniels PA-C Hemostasis: Manual Pressure

## 2022-09-08 NOTE — CONSULT NOTE ADULT - ASSESSMENT
55 yo female with left staghorn calculus, known to Dr. Alejandro of [facility], plan for PCNL next week, presenting to ED with c/o left flank pain and concern for decreased urination despite increased hydration. Cr elevated to 2 (baseline 1.14), concerning for obstruction. CT shows unchanged left staghorn with dilated upper pole calyces.  - consider medicine admission to monitor Cr and electrolytes  - no  surgical intervention as pt would most likely need IR NT if staghorn is indeed obstructing  - Consult IR in am for left NT placement  - NPO pMN for above procedure  - IVF  - cont abx  - f/u urine cx  - discussed with Dr. Buchanan
[employer]

## 2024-02-02 RX ORDER — ASPIRIN/CALCIUM CARB/MAGNESIUM 324 MG
0 TABLET ORAL
Qty: 0 | Refills: 0 | DISCHARGE

## 2024-02-02 RX ORDER — ACARBOSE 25 MG/1
50 TABLET ORAL
Qty: 0 | Refills: 0 | DISCHARGE

## 2024-02-02 RX ORDER — NITROFURANTOIN MACROCRYSTAL 50 MG
0 CAPSULE ORAL
Qty: 0 | Refills: 0 | DISCHARGE

## 2024-02-02 RX ORDER — IBUPROFEN 200 MG
0 TABLET ORAL
Qty: 0 | Refills: 0 | DISCHARGE

## 2024-02-02 RX ORDER — HYDROCHLOROTHIAZIDE 25 MG
12.5 TABLET ORAL
Qty: 0 | Refills: 0 | DISCHARGE

## 2024-02-02 RX ORDER — ACARBOSE 25 MG/1
0 TABLET ORAL
Qty: 0 | Refills: 0 | DISCHARGE

## 2024-02-02 RX ORDER — HYDROCHLOROTHIAZIDE 25 MG
0 TABLET ORAL
Qty: 0 | Refills: 0 | DISCHARGE

## 2024-02-02 RX ORDER — PIOGLITAZONE HYDROCHLORIDE 15 MG/1
0 TABLET ORAL
Qty: 0 | Refills: 0 | DISCHARGE

## 2024-02-02 RX ORDER — ATORVASTATIN CALCIUM 80 MG/1
80 TABLET, FILM COATED ORAL
Qty: 0 | Refills: 0 | DISCHARGE

## 2024-02-02 RX ORDER — ATORVASTATIN CALCIUM 80 MG/1
0 TABLET, FILM COATED ORAL
Qty: 0 | Refills: 0 | DISCHARGE

## 2024-02-02 RX ORDER — ASPIRIN/CALCIUM CARB/MAGNESIUM 324 MG
1 TABLET ORAL
Qty: 0 | Refills: 0 | DISCHARGE

## 2024-02-02 RX ORDER — LOSARTAN POTASSIUM 100 MG/1
0 TABLET, FILM COATED ORAL
Qty: 0 | Refills: 0 | DISCHARGE

## 2024-02-02 RX ORDER — PIOGLITAZONE HYDROCHLORIDE 15 MG/1
30 TABLET ORAL
Qty: 0 | Refills: 0 | DISCHARGE

## 2024-02-02 RX ORDER — LOSARTAN POTASSIUM 100 MG/1
50 TABLET, FILM COATED ORAL
Qty: 0 | Refills: 0 | DISCHARGE